# Patient Record
Sex: MALE | Race: WHITE | NOT HISPANIC OR LATINO
[De-identification: names, ages, dates, MRNs, and addresses within clinical notes are randomized per-mention and may not be internally consistent; named-entity substitution may affect disease eponyms.]

---

## 2017-07-17 ENCOUNTER — CHARTING TRANS (OUTPATIENT)
Dept: OTHER | Age: 37
End: 2017-07-17

## 2018-01-01 ENCOUNTER — HOSPITAL ENCOUNTER (EMERGENCY)
Facility: HOSPITAL | Age: 38
Discharge: HOME | End: 2018-01-01
Attending: EMERGENCY MEDICINE
Payer: COMMERCIAL

## 2018-01-01 ENCOUNTER — APPOINTMENT (OUTPATIENT)
Dept: GENERAL RADIOLOGY | Facility: HOSPITAL | Age: 38
End: 2018-01-01
Attending: EMERGENCY MEDICINE
Payer: COMMERCIAL

## 2018-01-01 VITALS
HEART RATE: 76 BPM | BODY MASS INDEX: 24.91 KG/M2 | SYSTOLIC BLOOD PRESSURE: 126 MMHG | HEIGHT: 70 IN | WEIGHT: 174 LBS | RESPIRATION RATE: 17 BRPM | TEMPERATURE: 98 F | DIASTOLIC BLOOD PRESSURE: 82 MMHG | OXYGEN SATURATION: 99 %

## 2018-01-01 DIAGNOSIS — M54.12 CERVICAL RADICULOPATHY: Primary | ICD-10-CM

## 2018-01-01 PROCEDURE — 99283 EMERGENCY DEPT VISIT LOW MDM: CPT

## 2018-01-01 PROCEDURE — 96372 THER/PROPH/DIAG INJ SC/IM: CPT

## 2018-01-01 PROCEDURE — 72050 X-RAY EXAM NECK SPINE 4/5VWS: CPT | Performed by: EMERGENCY MEDICINE

## 2018-01-01 RX ORDER — HYDROMORPHONE HYDROCHLORIDE 1 MG/ML
1 INJECTION, SOLUTION INTRAMUSCULAR; INTRAVENOUS; SUBCUTANEOUS ONCE
Status: COMPLETED | OUTPATIENT
Start: 2018-01-01 | End: 2018-01-01

## 2018-01-01 RX ORDER — CYCLOBENZAPRINE HCL 10 MG
10 TABLET ORAL 3 TIMES DAILY PRN
Qty: 20 TABLET | Refills: 0 | Status: SHIPPED | OUTPATIENT
Start: 2018-01-01 | End: 2018-01-08

## 2018-01-01 RX ORDER — METHYLPREDNISOLONE 4 MG/1
TABLET ORAL
Qty: 1 PACKAGE | Refills: 0 | Status: SHIPPED | OUTPATIENT
Start: 2018-01-01 | End: 2018-01-06

## 2018-01-01 NOTE — ED PROVIDER NOTES
Patient Seen in: [facility] Emergency Department    History   Patient presents with:  Neck Pain (musculoskeletal, neurologic)    Stated Complaint: left neck/shoulder pain with numbness to fingers    HPI    19-year-old male complaining of neck pain t abduction all within normal limits a sensations intact deep tendon reflexes are normal.    ED Course   Labs Reviewed - No data to display    ED Course as of Jan 01 1454  -----------------------------------------------------------Janny Ramos

## 2018-01-17 ENCOUNTER — OFFICE VISIT (OUTPATIENT)
Dept: FAMILY MEDICINE CLINIC | Facility: CLINIC | Age: 38
End: 2018-01-17

## 2018-01-17 ENCOUNTER — TELEPHONE (OUTPATIENT)
Dept: FAMILY MEDICINE CLINIC | Facility: CLINIC | Age: 38
End: 2018-01-17

## 2018-01-17 VITALS
DIASTOLIC BLOOD PRESSURE: 78 MMHG | WEIGHT: 175 LBS | RESPIRATION RATE: 20 BRPM | BODY MASS INDEX: 24.77 KG/M2 | HEIGHT: 70.5 IN | OXYGEN SATURATION: 98 % | HEART RATE: 101 BPM | TEMPERATURE: 98 F | SYSTOLIC BLOOD PRESSURE: 128 MMHG

## 2018-01-17 DIAGNOSIS — M54.12 CERVICAL RADICULOPATHY: Primary | ICD-10-CM

## 2018-01-17 PROCEDURE — 99203 OFFICE O/P NEW LOW 30 MIN: CPT | Performed by: FAMILY MEDICINE

## 2018-01-17 RX ORDER — ACETAMINOPHEN 325 MG/1
325 TABLET ORAL EVERY 6 HOURS PRN
COMMUNITY
End: 2019-08-20

## 2018-01-17 RX ORDER — COVID-19 ANTIGEN TEST
220 KIT MISCELLANEOUS
COMMUNITY
End: 2019-08-20

## 2018-01-17 RX ORDER — PREGABALIN 25 MG/1
CAPSULE ORAL
Qty: 60 CAPSULE | Refills: 0 | Status: SHIPPED | OUTPATIENT
Start: 2018-01-17 | End: 2018-03-10 | Stop reason: ALTCHOICE

## 2018-01-17 RX ORDER — HYDROCODONE BITARTRATE AND ACETAMINOPHEN 7.5; 325 MG/1; MG/1
1 TABLET ORAL EVERY 4 HOURS PRN
Qty: 30 TABLET | Refills: 0 | Status: SHIPPED | OUTPATIENT
Start: 2018-01-17 | End: 2018-02-16

## 2018-01-17 NOTE — PROGRESS NOTES
HPI:    Patient ID: Tariq Mcneil is a 40year old male. Pt has also has had a hx of dislocating his left shoulder. He has dislocated a total of two time the most recent incident occurring in 2007.  Pt states he still has full ROM but can feel a 7.5-325 MG Oral Tab Take 1 tablet by mouth every 4 (four) hours as needed for Pain. Disp: 30 tablet Rfl: 0     Allergies:No Known Allergies   PHYSICAL EXAM:   Physical Exam   Constitutional: He appears well-developed and well-nourished. No distress.    E[facility]  MRI SPINE LUMBAR (BAN=[id number])       [id number]

## 2018-01-19 ENCOUNTER — HOSPITAL ENCOUNTER (OUTPATIENT)
Dept: GENERAL RADIOLOGY | Age: 38
Discharge: HOME | End: 2018-01-19
Attending: FAMILY MEDICINE
Payer: COMMERCIAL

## 2018-01-19 ENCOUNTER — TELEPHONE (OUTPATIENT)
Dept: FAMILY MEDICINE CLINIC | Facility: CLINIC | Age: 38
End: 2018-01-19

## 2018-01-19 DIAGNOSIS — M54.12 CERVICAL RADICULOPATHY: Primary | ICD-10-CM

## 2018-01-19 DIAGNOSIS — Z01.818 PRE-OP TESTING: ICD-10-CM

## 2018-01-19 PROCEDURE — 70030 X-RAY EYE FOR FOREIGN BODY: CPT | Performed by: FAMILY MEDICINE

## 2018-01-19 NOTE — TELEPHONE ENCOUNTER
Pt at  saying shilpi is telling him a pa is needed for lyrica  Please switch med or start pa  Please advise

## 2018-01-23 ENCOUNTER — HOSPITAL ENCOUNTER (OUTPATIENT)
Dept: GENERAL RADIOLOGY | Age: 38
Discharge: HOME | End: 2018-01-23
Attending: FAMILY MEDICINE
Payer: COMMERCIAL

## 2018-01-23 ENCOUNTER — HOSPITAL ENCOUNTER (OUTPATIENT)
Dept: MRI IMAGING | Age: 38
Discharge: HOME | End: 2018-01-23
Attending: FAMILY MEDICINE
Payer: COMMERCIAL

## 2018-01-23 DIAGNOSIS — M54.12 CERVICAL RADICULOPATHY: ICD-10-CM

## 2018-01-23 DIAGNOSIS — Z01.818 PRE-OP TESTING: ICD-10-CM

## 2018-01-23 PROCEDURE — 70030 X-RAY EYE FOR FOREIGN BODY: CPT | Performed by: FAMILY MEDICINE

## 2018-01-23 PROCEDURE — 72141 MRI NECK SPINE W/O DYE: CPT | Performed by: FAMILY MEDICINE

## 2018-01-24 RX ORDER — GABAPENTIN 100 MG/1
100 CAPSULE ORAL 3 TIMES DAILY
Qty: 90 CAPSULE | Refills: 1 | Status: SHIPPED | OUTPATIENT
Start: 2018-01-24 | End: 2019-08-20

## 2018-01-30 ENCOUNTER — OFFICE VISIT (OUTPATIENT)
Dept: FAMILY MEDICINE CLINIC | Facility: CLINIC | Age: 38
End: 2018-01-30

## 2018-01-30 VITALS
BODY MASS INDEX: 24.63 KG/M2 | TEMPERATURE: 98 F | WEIGHT: 174 LBS | HEART RATE: 88 BPM | HEIGHT: 70.5 IN | OXYGEN SATURATION: 99 % | RESPIRATION RATE: 20 BRPM | DIASTOLIC BLOOD PRESSURE: 70 MMHG | SYSTOLIC BLOOD PRESSURE: 122 MMHG

## 2018-01-30 DIAGNOSIS — M54.12 CERVICAL RADICULOPATHY: Primary | ICD-10-CM

## 2018-01-30 PROCEDURE — 99213 OFFICE O/P EST LOW 20 MIN: CPT | Performed by: FAMILY MEDICINE

## 2018-01-30 NOTE — PROGRESS NOTES
HPI:    Patient ID: Tia Downs is a 40year old male. Pt came for a fup on his neck and shoulder injury. The MRI on his shoulder shows that he might need surgery.   His pain in now a 6 or 7 compared to a 9 or 10 from last time makes his feels radiculopathy  (primary encounter diagnosis)    fup on Feb 14th or 15th if needed    1. Cervical radiculopathy  - OP REFERRAL PAIN MANGEMENT      No orders of the defined types were placed in this encounter.       Meds This Visit:  No prescriptions requeste

## 2018-01-31 ENCOUNTER — TELEPHONE (OUTPATIENT)
Dept: FAMILY MEDICINE CLINIC | Facility: CLINIC | Age: 38
End: 2018-01-31

## 2018-01-31 ENCOUNTER — TELEPHONE (OUTPATIENT)
Dept: SURGERY | Facility: CLINIC | Age: 38
End: 2018-01-31

## 2018-02-12 ENCOUNTER — TELEPHONE (OUTPATIENT)
Dept: FAMILY MEDICINE CLINIC | Facility: CLINIC | Age: 38
End: 2018-02-12

## 2018-02-13 NOTE — TELEPHONE ENCOUNTER
LMTCB,  Do not see where Dr. Faby Rothman increased dose to 300mg. Called pt to clarify and confirm.

## 2018-02-14 NOTE — TELEPHONE ENCOUNTER
See attached phone messages. Last OV 1/30/18 YP Cervical Radiculopathy. Last refill 1/24/18 100mg TID.

## 2018-02-16 NOTE — TELEPHONE ENCOUNTER
If he has been taking 100mg and controlled give him that TID. If not controlled on 100mg TID then go to 300mg TID. If he has been taking 300mg TID then continue.

## 2018-03-10 ENCOUNTER — OFFICE VISIT (OUTPATIENT)
Dept: FAMILY MEDICINE CLINIC | Facility: CLINIC | Age: 38
End: 2018-03-10

## 2018-03-10 VITALS
OXYGEN SATURATION: 98 % | WEIGHT: 172 LBS | DIASTOLIC BLOOD PRESSURE: 80 MMHG | RESPIRATION RATE: 16 BRPM | TEMPERATURE: 97 F | SYSTOLIC BLOOD PRESSURE: 132 MMHG | HEIGHT: 70 IN | HEART RATE: 80 BPM | BODY MASS INDEX: 24.62 KG/M2

## 2018-03-10 DIAGNOSIS — J01.00 SUBACUTE MAXILLARY SINUSITIS: ICD-10-CM

## 2018-03-10 DIAGNOSIS — H10.9 BACTERIAL CONJUNCTIVITIS: Primary | ICD-10-CM

## 2018-03-10 PROCEDURE — 99213 OFFICE O/P EST LOW 20 MIN: CPT | Performed by: FAMILY MEDICINE

## 2018-03-10 RX ORDER — AMOXICILLIN AND CLAVULANATE POTASSIUM 875; 125 MG/1; MG/1
1 TABLET, FILM COATED ORAL 2 TIMES DAILY
Qty: 20 TABLET | Refills: 0 | Status: SHIPPED | OUTPATIENT
Start: 2018-03-10 | End: 2018-03-20

## 2018-03-10 RX ORDER — POLYMYXIN B SULFATE AND TRIMETHOPRIM 1; 10000 MG/ML; [USP'U]/ML
2 SOLUTION OPHTHALMIC 3 TIMES DAILY
Qty: 10 ML | Refills: 0 | Status: SHIPPED | OUTPATIENT
Start: 2018-03-10 | End: 2018-03-17

## 2018-03-10 NOTE — PROGRESS NOTES
Pt here with left eye irritation     Pt reports started a few days ago   Pt reports he started to feel irritation and it has gotten worse   Discharge coming out of the left eye   Pt works in construction and wears protective eye wear   OTC meds - none   no

## 2018-11-03 VITALS
RESPIRATION RATE: 16 BRPM | HEIGHT: 71 IN | WEIGHT: 170 LBS | TEMPERATURE: 98.1 F | SYSTOLIC BLOOD PRESSURE: 124 MMHG | HEART RATE: 66 BPM | BODY MASS INDEX: 23.8 KG/M2 | DIASTOLIC BLOOD PRESSURE: 78 MMHG

## 2019-08-20 ENCOUNTER — OFFICE VISIT (OUTPATIENT)
Dept: FAMILY MEDICINE CLINIC | Facility: CLINIC | Age: 39
End: 2019-08-20
Payer: COMMERCIAL

## 2019-08-20 VITALS
WEIGHT: 172 LBS | HEART RATE: 92 BPM | SYSTOLIC BLOOD PRESSURE: 122 MMHG | OXYGEN SATURATION: 98 % | TEMPERATURE: 99 F | HEIGHT: 70 IN | BODY MASS INDEX: 24.62 KG/M2 | RESPIRATION RATE: 20 BRPM | DIASTOLIC BLOOD PRESSURE: 72 MMHG

## 2019-08-20 DIAGNOSIS — R61 DIAPHORESIS: ICD-10-CM

## 2019-08-20 DIAGNOSIS — Z00.00 ANNUAL PHYSICAL EXAM: Primary | ICD-10-CM

## 2019-08-20 LAB
GLUCOSE BLOOD: 96
TEST STRIP LOT #: NORMAL NUMERIC

## 2019-08-20 PROCEDURE — 99395 PREV VISIT EST AGE 18-39: CPT | Performed by: FAMILY MEDICINE

## 2019-08-20 PROCEDURE — 82962 GLUCOSE BLOOD TEST: CPT | Performed by: FAMILY MEDICINE

## 2019-08-20 NOTE — H&P
Zia Antunez is a 45year old male who presents for a complete physical exam.   HPI:   Warts   This is a chronic problem. The current episode started more than 1 year ago. The problem has been unchanged. Nothing aggravates the symptoms.  He has tri well developed, well nourished,in no apparent distress  SKIN: 1 wart on the 3rd digit of the right hand and 1 wart on the 2nd digit of the left hand  HEENT: atraumatic, normocephalic, PERRLA, conjunctiva clear, TMs clear, posterior pharynx clear, no conges Standing Status: Future          Standing Expiration Date: 8/20/2020      Cortisol [E]          Standing Status: Future          Standing Expiration Date: 8/20/2020      Accucheck

## 2025-04-14 ENCOUNTER — APPOINTMENT (OUTPATIENT)
Dept: CT IMAGING | Facility: HOSPITAL | Age: 45
End: 2025-04-14
Attending: EMERGENCY MEDICINE
Payer: COMMERCIAL

## 2025-04-14 ENCOUNTER — HOSPITAL ENCOUNTER (EMERGENCY)
Facility: HOSPITAL | Age: 45
Discharge: HOME | End: 2025-04-15
Attending: EMERGENCY MEDICINE
Payer: COMMERCIAL

## 2025-04-14 VITALS
HEART RATE: 90 BPM | TEMPERATURE: 98 F | OXYGEN SATURATION: 98 % | HEIGHT: 70 IN | SYSTOLIC BLOOD PRESSURE: 127 MMHG | DIASTOLIC BLOOD PRESSURE: 75 MMHG | RESPIRATION RATE: 17 BRPM | BODY MASS INDEX: 25.77 KG/M2 | WEIGHT: 180 LBS

## 2025-04-14 DIAGNOSIS — S09.90XA INJURY OF HEAD, INITIAL ENCOUNTER: Primary | ICD-10-CM

## 2025-04-14 DIAGNOSIS — S19.9XXA INJURY OF NECK, INITIAL ENCOUNTER: ICD-10-CM

## 2025-04-14 LAB
ALBUMIN SERPL-MCNC: 4.9 G/DL (ref 3.2–4.8)
ALBUMIN/GLOB SERPL: 1.8 {RATIO} (ref 1–2)
ALP LIVER SERPL-CCNC: 96 U/L (ref 45–117)
ALT SERPL-CCNC: 28 U/L (ref 10–49)
ANION GAP SERPL CALC-SCNC: 8 MMOL/L (ref 0–18)
AST SERPL-CCNC: 20 U/L (ref ?–34)
BASOPHILS # BLD AUTO: 0.05 X10(3) UL (ref 0–0.2)
BASOPHILS NFR BLD AUTO: 0.5 %
BILIRUB SERPL-MCNC: 0.6 MG/DL (ref 0.3–1.2)
BUN BLD-MCNC: 10 MG/DL (ref 9–23)
CALCIUM BLD-MCNC: 9.7 MG/DL (ref 8.7–10.6)
CHLORIDE SERPL-SCNC: 104 MMOL/L (ref 98–112)
CO2 SERPL-SCNC: 26 MMOL/L (ref 21–32)
CREAT BLD-MCNC: 1.05 MG/DL (ref 0.7–1.3)
EGFRCR SERPLBLD CKD-EPI 2021: 90 ML/MIN/1.73M2 (ref 60–?)
EOSINOPHIL # BLD AUTO: 0.16 X10(3) UL (ref 0–0.7)
EOSINOPHIL NFR BLD AUTO: 1.5 %
ERYTHROCYTE [DISTWIDTH] IN BLOOD BY AUTOMATED COUNT: 12.6 %
GLOBULIN PLAS-MCNC: 2.7 G/DL (ref 2–3.5)
GLUCOSE BLD-MCNC: 159 MG/DL (ref 70–99)
HCT VFR BLD AUTO: 40.1 % (ref 39–53)
HGB BLD-MCNC: 14.1 G/DL (ref 13–17.5)
IMM GRANULOCYTES # BLD AUTO: 0.03 X10(3) UL (ref 0–1)
IMM GRANULOCYTES NFR BLD: 0.3 %
LYMPHOCYTES # BLD AUTO: 3.37 X10(3) UL (ref 1–4)
LYMPHOCYTES NFR BLD AUTO: 31.1 %
MCH RBC QN AUTO: 31.6 PG (ref 26–34)
MCHC RBC AUTO-ENTMCNC: 35.2 G/DL (ref 31–37)
MCV RBC AUTO: 89.9 FL (ref 80–100)
MONOCYTES # BLD AUTO: 0.95 X10(3) UL (ref 0.1–1)
MONOCYTES NFR BLD AUTO: 8.8 %
NEUTROPHILS # BLD AUTO: 6.26 X10 (3) UL (ref 1.5–7.7)
NEUTROPHILS # BLD AUTO: 6.26 X10(3) UL (ref 1.5–7.7)
NEUTROPHILS NFR BLD AUTO: 57.8 %
OSMOLALITY SERPL CALC.SUM OF ELEC: 288 MOSM/KG (ref 275–295)
PLATELET # BLD AUTO: 278 10(3)UL (ref 150–450)
POTASSIUM SERPL-SCNC: 3.5 MMOL/L (ref 3.5–5.1)
PROT SERPL-MCNC: 7.6 G/DL (ref 5.7–8.2)
RBC # BLD AUTO: 4.46 X10(6)UL (ref 4.3–5.7)
SODIUM SERPL-SCNC: 138 MMOL/L (ref 136–145)
WBC # BLD AUTO: 10.8 X10(3) UL (ref 4–11)

## 2025-04-14 PROCEDURE — 36415 COLL VENOUS BLD VENIPUNCTURE: CPT

## 2025-04-14 PROCEDURE — 99285 EMERGENCY DEPT VISIT HI MDM: CPT

## 2025-04-14 PROCEDURE — 70491 CT SOFT TISSUE NECK W/DYE: CPT | Performed by: EMERGENCY MEDICINE

## 2025-04-14 PROCEDURE — 70450 CT HEAD/BRAIN W/O DYE: CPT | Performed by: EMERGENCY MEDICINE

## 2025-04-14 PROCEDURE — 80053 COMPREHEN METABOLIC PANEL: CPT | Performed by: EMERGENCY MEDICINE

## 2025-04-14 PROCEDURE — 99284 EMERGENCY DEPT VISIT MOD MDM: CPT

## 2025-04-14 PROCEDURE — 85025 COMPLETE CBC W/AUTO DIFF WBC: CPT | Performed by: EMERGENCY MEDICINE
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
## 2025-05-06 ENCOUNTER — HOSPITAL ENCOUNTER (EMERGENCY)
Facility: HOSPITAL | Age: 45
Discharge: LEFT BEFORE DISCHARGE | End: 2025-05-06
Payer: COMMERCIAL

## 2025-05-06 VITALS
SYSTOLIC BLOOD PRESSURE: 150 MMHG | RESPIRATION RATE: 16 BRPM | OXYGEN SATURATION: 97 % | HEIGHT: 70 IN | BODY MASS INDEX: 25.2 KG/M2 | TEMPERATURE: 98 F | DIASTOLIC BLOOD PRESSURE: 88 MMHG | HEART RATE: 96 BPM | WEIGHT: 176 LBS

## 2025-05-06 PROCEDURE — 99281 EMR DPT VST MAYX REQ PHY/QHP: CPT

## 2025-05-06 PROCEDURE — 93005 ELECTROCARDIOGRAM TRACING: CPT

## 2025-05-07 ENCOUNTER — TELEPHONE (OUTPATIENT)
Dept: INTERNAL MEDICINE CLINIC | Facility: CLINIC | Age: 45
End: 2025-05-07

## 2025-05-07 DIAGNOSIS — R73.9 HYPERGLYCEMIA: ICD-10-CM

## 2025-05-07 DIAGNOSIS — R00.2 PALPITATIONS: Primary | ICD-10-CM

## 2025-05-07 DIAGNOSIS — Z13.6 ENCOUNTER FOR LIPID SCREENING FOR CARDIOVASCULAR DISEASE: ICD-10-CM

## 2025-05-07 DIAGNOSIS — Z13.220 ENCOUNTER FOR LIPID SCREENING FOR CARDIOVASCULAR DISEASE: ICD-10-CM

## 2025-05-07 LAB
ATRIAL RATE: 93 BPM
P AXIS: 63 DEGREES
P-R INTERVAL: 136 MS
Q-T INTERVAL: 338 MS
QRS DURATION: 98 MS
QTC CALCULATION (BEZET): 420 MS
R AXIS: 62 DEGREES
T AXIS: 29 DEGREES
VENTRICULAR RATE: 93 BPM

## 2025-05-07 NOTE — ED INITIAL ASSESSMENT (HPI)
PT STARTED FIRST DOSE OF HYDROXYZINE TONIGHT AROUND 1900, FELT IMMEDIATE REACTION OF TIGHTNESS TO LEFT SIDE ARM/LEG. FELT INTERNAL ITCHING, DENIES RASH.  DROVE HIMSELF HERE.  APPEARS IN NO DISTRESS.  TAKING MED FOR DEPRESSION.

## 2025-05-10 ENCOUNTER — TELEPHONE (OUTPATIENT)
Age: 45
End: 2025-05-10

## 2025-05-10 NOTE — TELEPHONE ENCOUNTER
Hello,  Sorry I missed you - I am reaching out from the [facility] department, following up on an order from your provider's office to assist in connecting you with resources for care. If you would like to discuss this further, please give us a call back at [phone], or for more immediate assistance you can contact our 24-hour help line at [phone] We look forward to hearing from you soon.

## 2025-05-10 NOTE — TELEPHONE ENCOUNTER
Woody Pierre,  I'm glad we were able to connect. You are scheduled for:  Medication Management Evaluation appointment with JOHN Brooks on 06/24/25 at 5pm at our [facility]-[address] [place] [address] [address] [address].  If you have any questions, the [facility] Office can be reached at [phone].    Additionally, here are some therapy resources that may be a good fit for you. Based on their online profiles, they are in network with your current insurance, BCBS PPO. Please verify your insurance coverage with any providers that you may choose to call and schedule with directly. If you have any other questions, please give me a call at [phone]. If you need more immediate assistance, or assistance outside of business hours, please contact the [facility] 24/7 helpline at [phone] ([phone]).    Individual Therapy:  [facility]-Also will offer Couples Counseling  Ronan Bangura, [facility]  Dg Bangura, MSW   [address], [place] [address]  [phone]    [facility]Balwinder Wright, [facility]  [address] [facility] [address]  [place] [address]  [phone]    [facility]-Jordana Gordon, hospitals  Jaren Heredia [facility]  Dr. Page Stroud, [facility]   [address]  [place] [address]  [phone]    [facility]  [address] [facility] [address]  [place] [address]  [phone]    Couples Therapy:  [facility]-Madi Cerda (Rod) [facility]  Ángel Cazares, [facility]   [address]  [place] [address]  [phone]    [facility]  [address]  [place] [address]  [phone]    Ronel Stanton hospitals  Behavioral Health Navigator   [facility]   24/7 Crisis Line [phone] ([phone])

## 2025-05-12 NOTE — PROGRESS NOTES
Psychiatrist-China Nolasco, JOHN for 06/24/2025 @ 5p [facility]    Additionally, the following therapy resources were provided to the patient:  Individual Therapy:  [facility]-Also will offer Couples Counseling  Ronan Bangura, SUDHA Bangura, MSW  [address] [place] [address]  [phone]    [facility]Balwinder Wright, [facility]  [address]  [place] [address]  [phone]    [facility]-Jordana Gordon, [facility]  Jaren Heredia, [facility]  Dr. Page Stroud, [facility]  [address]  [place] [address]  [phone]    [facility]  [address] [facility] [address]  [place] [address]  [phone]    Couples Therapy:  [facility]-Madi Cerda (Rod), SUDHA Cazares, [facility]  [address] [facility]  [place] [address]  [phone]    [facility]  [address]  [place] [address]  [phone]

## 2025-05-13 ENCOUNTER — HOSPITAL ENCOUNTER (OUTPATIENT)
Dept: CV DIAGNOSTICS | Facility: HOSPITAL | Age: 45
Discharge: HOME | End: 2025-05-13
Attending: STUDENT IN AN ORGANIZED HEALTH CARE EDUCATION/TRAINING PROGRAM
Payer: COMMERCIAL

## 2025-05-13 DIAGNOSIS — R00.2 PALPITATIONS: ICD-10-CM

## 2025-05-13 PROCEDURE — 93242 EXT ECG>48HR<7D RECORDING: CPT | Performed by: STUDENT IN AN ORGANIZED HEALTH CARE EDUCATION/TRAINING PROGRAM

## 2025-05-13 PROCEDURE — 93243 EXT ECG>48HR<7D SCAN A/R: CPT | Performed by: STUDENT IN AN ORGANIZED HEALTH CARE EDUCATION/TRAINING PROGRAM

## 2025-05-13 PROCEDURE — 93306 TTE W/DOPPLER COMPLETE: CPT | Performed by: STUDENT IN AN ORGANIZED HEALTH CARE EDUCATION/TRAINING PROGRAM

## 2025-06-03 ENCOUNTER — OFFICE VISIT (OUTPATIENT)
Dept: INTERNAL MEDICINE CLINIC | Facility: CLINIC | Age: 45
End: 2025-06-03
Payer: COMMERCIAL

## 2025-06-03 VITALS
WEIGHT: 178.38 LBS | BODY MASS INDEX: 26.12 KG/M2 | HEART RATE: 85 BPM | TEMPERATURE: 98 F | HEIGHT: 69.29 IN | SYSTOLIC BLOOD PRESSURE: 126 MMHG | OXYGEN SATURATION: 98 % | RESPIRATION RATE: 20 BRPM | DIASTOLIC BLOOD PRESSURE: 80 MMHG

## 2025-06-03 DIAGNOSIS — F63.0 GAMBLING DISORDER, IN EARLY REMISSION: ICD-10-CM

## 2025-06-03 DIAGNOSIS — F43.23 ADJUSTMENT DISORDER WITH MIXED ANXIETY AND DEPRESSED MOOD: ICD-10-CM

## 2025-06-03 PROCEDURE — G2211 COMPLEX E/M VISIT ADD ON: HCPCS | Performed by: STUDENT IN AN ORGANIZED HEALTH CARE EDUCATION/TRAINING PROGRAM

## 2025-06-03 PROCEDURE — 99214 OFFICE O/P EST MOD 30 MIN: CPT | Performed by: STUDENT IN AN ORGANIZED HEALTH CARE EDUCATION/TRAINING PROGRAM

## 2025-06-03 PROCEDURE — 3079F DIAST BP 80-89 MM HG: CPT | Performed by: STUDENT IN AN ORGANIZED HEALTH CARE EDUCATION/TRAINING PROGRAM

## 2025-06-03 PROCEDURE — 3074F SYST BP LT 130 MM HG: CPT | Performed by: STUDENT IN AN ORGANIZED HEALTH CARE EDUCATION/TRAINING PROGRAM

## 2025-06-03 PROCEDURE — 3008F BODY MASS INDEX DOCD: CPT | Performed by: STUDENT IN AN ORGANIZED HEALTH CARE EDUCATION/TRAINING PROGRAM

## 2025-06-03 RX ORDER — ESCITALOPRAM OXALATE 20 MG/1
20 TABLET ORAL DAILY
Qty: 90 TABLET | Refills: 1 | Status: SHIPPED | OUTPATIENT
Start: 2025-06-03

## 2025-06-03 RX ORDER — CHLORHEXIDINE GLUCONATE ORAL RINSE 1.2 MG/ML
15 SOLUTION DENTAL 2 TIMES DAILY
COMMUNITY
Start: 2025-05-13

## 2025-06-03 NOTE — PATIENT INSTRUCTIONS
VISIT SUMMARY:  Today, we discussed your progress with acute stress response and medication management. You have noticed an improvement in your mood since returning home and have quit gambling, which is a positive step. We also addressed your sleep disturbances, oral dysplasia, and general health maintenance.    YOUR PLAN:  -ACUTE STRESS RESPONSE: An acute stress response is a reaction to a major life change that can lead to significant emotional distress. You have shown improvement since returning home and quitting gambling. Continue your current medication for six months, monitor your mood and anxiety levels, and attend therapy sessions with your psychologist. We will consider adjusting your medication dose based on your motivation and mood changes.    -SLEEP DISTURBANCE: Sleep disturbance involves difficulty staying asleep through the night. This may be related to your medication. Try taking melatonin and consider magnesium supplements to help improve your sleep. We will monitor your sleep patterns and consider other sleep aids if necessary.    -ORAL DYSPLASIA: Oral dysplasia is a condition where there are abnormal cells in the lining of your mouth, which can potentially progress to cancer. This is likely related to smoking. We will request records from [facility] for further evaluation and monitor your oral health. It is strongly encouraged that you quit smoking to prevent progression.    -GENERAL HEALTH MAINTENANCE: Routine lab work is needed to monitor your diabetes, cholesterol, and thyroid function. Your blood pressure has improved but should still be checked occasionally. It is important to continue working towards quitting smoking.    INSTRUCTIONS:  Please continue your current medication for six months and monitor your mood and anxiety levels. Try melatonin and consider magnesium supplements for your sleep disturbances. We will request records from [facility] to further evaluate  your oral dysplasia. You are due for routine lab work to monitor diabetes, cholesterol, and thyroid function, which should be done at [facility]. Monitor your blood pressure occasionally and continue working towards quitting smoking. Your next appointment with the psychologist is on June 24th.    Contains text generated by Claudia

## 2025-06-03 NOTE — PROGRESS NOTES
CHIEF COMPLAINT:   Chief Complaint   Patient presents with    Medication Follow-Up     Med's follow up.        HPI:   Nakul Farooq is a 44 year old male who presents for depression and anxiety.     Wt Readings from Last 6 Encounters:   06/03/25 178 lb 6.4 oz (80.9 kg)   05/06/25 179 lb (81.2 kg)   04/14/25 180 lb (81.6 kg)   08/20/19 172 lb (78 kg)   03/10/18 172 lb (78 kg)   01/30/18 174 lb (78.9 kg)     Body mass index is 26.13 kg/m².     History of Present Illness  Ignacio Farooq is a 44 year old male who presents for follow-up of acute stress response and medication management.    He has been back home and feels that his mood has improved since returning. He is currently on a low dose of medication, specifically 20 mg, which he takes in the morning or evening depending on when he remembers. He does not notice a significant difference in his mood from the medication itself and attributes the improvement to being back home. He has scheduled an appointment with a psychologist for June 24th.    He has quit gambling, which he believes has contributed positively to his well-being. Despite the medication, he experiences difficulty sleeping through the night, waking up a couple of times, typically around 2-3 AM. This sleep disturbance is a new development since starting the medication. No racing thoughts or vivid dreams.    He has a history of smoking and has recently seen a dentist who performed a biopsy on his gums, which indicated a low-grade dysplasia. He is considering quitting smoking but has not yet done so.    He is not currently taking hydroxyzine as it was not well-tolerated in the past. His wife has high blood pressure and is on medication for it.       Current Medications[1]   Past Medical History[2]   Past Surgical History[3]   Family History[4]   Social History:   Short Social Hx on File[5]  Occ: choudhary. : yes. Children: stepchildren.   Exercise: none.  Diet: doesn't watch     REVIEW  OF SYSTEMS:   Negative except for what is mentioned in HPI.     Screenings:   1. Little interest or pleasure in doing things: Not at all  2. Feeling down, depressed, or hopeless: Not at all  3. Trouble falling or staying asleep, or sleeping too much: Several days  4. Feeling tired or having little energy: Not at all  5. Poor appetite or overeating: Not at all  6. Feeling bad about yourself - or that you are a failure or have let yourself or your family down: Not at all  7. Trouble concentrating on things, such as reading the newspaper or watching television: Not at all  8. Moving or speaking so slowly that other people could have noticed. Or the opposite - being so fidgety or restless that you have been moving around a lot more than usual: Not at all  9. Thoughts that you would be better off dead, or of hurting yourself in some way: Not at all  PHQ-9 TOTAL SCORE: 1  If you checked off any problems, how difficult have these problems made it for you to do your work, take care of things at home, or get along with other people?: Not difficult at all       EXAM:   /80 (BP Location: Right arm, Patient Position: Sitting, Cuff Size: adult)   Pulse 85   Temp 98.2 °F (36.8 °C) (Temporal)   Resp 20   Ht 5' 9.29\" (1.76 m)   Wt 178 lb 6.4 oz (80.9 kg)   SpO2 98%   BMI 26.13 kg/m²   Body mass index is 26.13 kg/m².   GENERAL: well developed, well nourished,in no apparent distress. Tearful on exam.     Physical Exam  VITALS: BP- 120/80  MEASUREMENTS: Weight- 178.4.    Labs:   Lab Results   Component Value Date/Time    WBC 10.8 04/14/2025 10:21 PM    HGB 14.1 04/14/2025 10:21 PM    .0 04/14/2025 10:21 PM      Lab Results   Component Value Date/Time     (H) 04/14/2025 10:21 PM     04/14/2025 10:21 PM    K 3.5 04/14/2025 10:21 PM     04/14/2025 10:21 PM    CO2 26.0 04/14/2025 10:21 PM    CREATSERUM 1.05 04/14/2025 10:21 PM    CA 9.7 04/14/2025 10:21 PM    ALB 4.9 (H) 04/14/2025 10:21 PM    TP 7.6  04/14/2025 10:21 PM    ALKPHO 96 04/14/2025 10:21 PM    AST 20 04/14/2025 10:21 PM    ALT 28 04/14/2025 10:21 PM    BILT 0.6 04/14/2025 10:21 PM        No results found for: \"CHOLEST\", \"HDL\", \"TRIG\", \"LDL\", \"NONHDLC\"    No results found for: \"A1C\"   No results found for: \"VITD\"      Imaging:   CT SOFT TISSUE OF NECK(CONTRAST ONLY) (CPT=70491)  Result Date: 4/14/2025  CONCLUSION:  No evidence of acute injury to the thyroid cartilage or the hyoid bone.   LOCATION:  [facility]    Dictated by (CST): Seamus Anthony MD on 4/14/2025 at 11:31 PM     Finalized by (CST): Seamus Anthony MD on 4/14/2025 at 11:32 PM       CT BRAIN OR HEAD (CPT=70450)  Result Date: 4/14/2025  CONCLUSION:  No acute intracranial process.    LOCATION:  [facility]   Dictated by (CST): Seamus Anthony MD on 4/14/2025 at 11:30 PM     Finalized by (CST): Seamus Anthony MD on 4/14/2025 at 11:30 PM       Assessment & Plan  //Acute Stress Response  //Adjustment disorder with mixed anxiety and depressed mood  He experienced an acute stress response due to a major life change, leading to a mental breakdown. The stressor has resolved, and he reports improvement since returning home. He is on a low dose of medication, contributing to improved mood and cessation of gambling. The decision to continue the medication for six months is based on potential benefits for mood stabilization and gambling cessation. Motivation levels will be monitored to assess the need for dose adjustment.  - Continue lexapro 20mg daily for six months  - Monitor mood and anxiety levels  - Encourage therapy sessions with a psychologist  - Consider dose adjustment based on motivation and mood changes    //Sleep Disturbance  He reports waking up multiple times during the night, a new symptom since starting the medication. There are no associated symptoms such as racing thoughts or vivid dreams. The sleep disturbance may be related to the medication. Melatonin and magnesium are recommended as initial  interventions to improve sleep maintenance.  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 The condition is currently low risk but could progress to cancer if it advances through the layers of oral tissue. Smoking cessation is strongly encouraged to prevent progression.  - Request records from [facility] for further evaluation  - Monitor oral health and dysplasia progression  - Encourage smoking cessation    General Health Maintenance  He is due for routine lab work to monitor diabetes, cholesterol, and thyroid function. Blood pressure is improved but should be monitored occasionally. Insurance requires lab work to be [facility].  - Order labs for diabetes, cholesterol, and thyroid function  - Monitor blood pressure occasionally  - Encourage smoking cessation    Recording duration: 9 minutes    HEALTH MAINTENANCE:   -Vaccinations: Prevnar due, Shingrix not indicated, Flu not in season, COVID due, TdAP due   -Colonoscopy: -  -Diabetes screening: Last A1c value was  % done  .  -Lipid screening: No Lipid panel on file.   -Smoking: Current   -Alcohol: Uses  -Marijuana: denies  -Recreational drug: denies    Return in about 6 months (around 12/3/2025) for Physical exam .    Jamilah Teixeira MD   Internal Medicine          The following individual(s) verbally consented to be recorded using ambient AI listening technology and understand that they can each withdraw their consent to this listening technology at any point by asking the clinician to turn off or pause the recording:    Patient name: Nakul Farooq               [1]   Current Outpatient Medications   Medication Sig Dispense Refill    chlorhexidine gluconate 0.12 % Mouth/Throat Solution 15 mL 2 (two) times daily.      escitalopram 20 MG Oral Tab Take 1 tablet (20 mg  total) by mouth daily. Start this one on day 15 and continue on 90 tablet 1    ibuprofen 100 MG Oral Tab Take 100 mg by mouth every 6 (six) hours as needed for Fever.     [2] History reviewed. No pertinent past medical history.  [3] History reviewed. No pertinent surgical history.  [4] History reviewed. No pertinent family history.  [5]   Social History  Socioeconomic History    Marital status:    Tobacco Use    Smoking status: Every Day     Passive exposure: Current    Smokeless tobacco: Never   Vaping Use    Vaping status: Never Used   Substance and Sexual Activity    Alcohol use: Not Currently     Comment: 2-3 drinks daily.    Drug use: Never

## 2025-07-09 ENCOUNTER — TELEPHONE (OUTPATIENT)
Dept: INTERNAL MEDICINE CLINIC | Facility: CLINIC | Age: 45
End: 2025-07-09

## 2025-07-09 NOTE — TELEPHONE ENCOUNTER
Medical records release have been ,faxed [facility] @ [phone]     awaiting patient Dental procedures notes

## (undated) NOTE — ED AVS SNAPSHOT
Jay Felix   MRN: [id number]    Department:  [facility] Emergency Department   Date of Visit:  1/1/2018           Disclosure     Insurance plans vary and the physician(s) referred by the ER may not be covered by your plan.  Please contac tell this physician (or your personal doctor if your instructions are to return to your personal doctor) about any new or lasting problems. The primary care or specialist physician will see patients referred from the [facility] Emergency Department.  Edison Calero

## (undated) NOTE — LETTER
January 1, 2018    Patient: Zeus Pyo   Date of Visit: 1/1/2018       To Whom It May Concern:    Jerome Khanna was seen and treated in our emergency department on 1/1/2018.  He should not return to work until January 6 may return sooner if

## (undated) NOTE — LETTER
09/19/19        [address] [id number]      Dear Kota Alvarenga,    [address] [facility] records indicate that you have outstanding lab work and or testing that was ordered for you and has not yet been completed:  Orders Placed This Encou